# Patient Record
Sex: FEMALE | Race: WHITE | NOT HISPANIC OR LATINO | URBAN - METROPOLITAN AREA
[De-identification: names, ages, dates, MRNs, and addresses within clinical notes are randomized per-mention and may not be internally consistent; named-entity substitution may affect disease eponyms.]

---

## 2021-02-15 ENCOUNTER — TELEPHONE (OUTPATIENT)
Dept: PSYCHOLOGY | Facility: CLINIC | Age: 56
End: 2021-02-15

## 2022-03-18 PROBLEM — R01.1 MURMUR: Status: ACTIVE | Noted: 2017-03-02

## 2022-03-18 PROBLEM — R35.0 FREQUENCY OF URINATION: Status: ACTIVE | Noted: 2018-05-29

## 2022-03-19 PROBLEM — L73.2 HYDRADENITIS: Status: ACTIVE | Noted: 2017-01-17

## 2022-03-19 PROBLEM — R82.998 CRYSTALLURIA: Status: ACTIVE | Noted: 2018-05-29

## 2022-08-02 LAB — HBA1C MFR BLD HPLC: 5.7 %

## 2022-08-24 ENCOUNTER — OFFICE VISIT (OUTPATIENT)
Dept: RHEUMATOLOGY | Facility: CLINIC | Age: 57
End: 2022-08-24
Payer: MEDICARE

## 2022-08-24 VITALS
HEIGHT: 66 IN | BODY MASS INDEX: 43.62 KG/M2 | SYSTOLIC BLOOD PRESSURE: 120 MMHG | DIASTOLIC BLOOD PRESSURE: 74 MMHG | WEIGHT: 271.4 LBS

## 2022-08-24 DIAGNOSIS — M17.10 ARTHRITIS OF KNEE: ICD-10-CM

## 2022-08-24 DIAGNOSIS — E66.01 OBESITY, MORBID (HCC): ICD-10-CM

## 2022-08-24 DIAGNOSIS — M05.79 RHEUMATOID ARTHRITIS INVOLVING MULTIPLE SITES WITH POSITIVE RHEUMATOID FACTOR (HCC): Primary | ICD-10-CM

## 2022-08-24 PROCEDURE — 99204 OFFICE O/P NEW MOD 45 MIN: CPT | Performed by: INTERNAL MEDICINE

## 2022-08-24 RX ORDER — METHOTREXATE 2.5 MG/1
TABLET ORAL
COMMUNITY
Start: 2022-06-01 | End: 2022-08-24 | Stop reason: SDUPTHER

## 2022-08-24 RX ORDER — OMEPRAZOLE 40 MG/1
CAPSULE, DELAYED RELEASE ORAL
COMMUNITY
Start: 2022-05-21

## 2022-08-24 RX ORDER — HYDROCODONE BITARTRATE AND ACETAMINOPHEN 7.5; 325 MG/1; MG/1
TABLET ORAL
COMMUNITY
Start: 2022-06-13

## 2022-08-24 RX ORDER — METHOTREXATE 2.5 MG/1
TABLET ORAL
Qty: 100 TABLET | Refills: 3 | Status: SHIPPED | OUTPATIENT
Start: 2022-08-24

## 2022-08-24 RX ORDER — PREDNISONE 1 MG/1
5 TABLET ORAL 2 TIMES DAILY WITH MEALS
Qty: 60 TABLET | Refills: 3 | Status: SHIPPED | OUTPATIENT
Start: 2022-08-24

## 2022-08-24 RX ORDER — LIOTHYRONINE SODIUM 5 UG/1
TABLET ORAL
COMMUNITY
Start: 2022-08-16

## 2022-08-24 RX ORDER — METOPROLOL SUCCINATE 50 MG/1
TABLET, EXTENDED RELEASE ORAL
COMMUNITY
Start: 2022-08-16

## 2022-08-24 RX ORDER — ROPINIROLE 2 MG/1
TABLET, FILM COATED ORAL
COMMUNITY
Start: 2022-08-22

## 2022-08-24 RX ORDER — ZOLPIDEM TARTRATE 12.5 MG/1
TABLET, FILM COATED, EXTENDED RELEASE ORAL
COMMUNITY
Start: 2022-07-29

## 2022-08-24 RX ORDER — DICLOFENAC SODIUM 20 MG/G
40 SOLUTION TOPICAL 2 TIMES DAILY
Qty: 112 G | Refills: 3 | Status: SHIPPED | OUTPATIENT
Start: 2022-08-24 | End: 2022-09-06 | Stop reason: SDUPTHER

## 2022-08-24 RX ORDER — AMMONIUM LACTATE 12 G/100G
LOTION TOPICAL
COMMUNITY
Start: 2022-06-13

## 2022-08-24 RX ORDER — APIXABAN 5 MG/1
TABLET, FILM COATED ORAL
COMMUNITY
Start: 2022-08-16

## 2022-08-24 RX ORDER — FOLIC ACID 1 MG/1
2 TABLET ORAL DAILY
Qty: 180 TABLET | Refills: 3 | Status: SHIPPED | OUTPATIENT
Start: 2022-08-24

## 2022-08-24 RX ORDER — MOMETASONE FUROATE 1 MG/G
CREAM TOPICAL
COMMUNITY
Start: 2022-06-13

## 2022-08-24 RX ORDER — LEVOTHYROXINE SODIUM 150 MCG
TABLET ORAL
COMMUNITY
Start: 2022-06-01

## 2022-08-24 RX ORDER — FENTANYL 25 UG/H
PATCH TRANSDERMAL
COMMUNITY
Start: 2022-06-13

## 2022-08-24 RX ORDER — FOLIC ACID 1 MG/1
TABLET ORAL
COMMUNITY
Start: 2022-07-29 | End: 2022-08-24 | Stop reason: SDUPTHER

## 2022-08-24 NOTE — PATIENT INSTRUCTIONS
Continue taking the folic acid 2 tabs every day  Take the methotrexate every Friday but divide the dose to 4 tabs at dinner and 4 tabs at bedtime (this will help with the absorption of the medication)  Please continue to have your blood work done every 3 months  You can also try Aspercreme with lidocaine and apply it to your painful joints 2-3 times per day

## 2022-08-24 NOTE — PROGRESS NOTES
Rheumatology Consult   Sharifa Waddell 65 y o  female 1965    DATE: 8/24/2022    Reason for Consult: 12 yr history of RA    Assessment and Plan:  Seropositive RA on methotrexate 88SU weekly and folic acid daily  Patient failed [place]  Rash with Plaquenil  Inefficacy of Arava  Continue PT for knees  Anti-inflammatory diet/exercise/weight control  Topical Pennsaid BID knee pain  Topical analgesics PRN  F/u in 6 mos  or sooner if necessary    History of Present Illness:  Sharifa Waddell is a 65 y o  female Seropositive RA diagnosed 16 yrs  ago on MTX and folic acid  Takes prednisone PRN joint swelling  No current complaints  All recent labs reviewed in detail with patient  Review of Systems  Review of Systems   Constitutional: Negative for fatigue, fever and unexpected weight change  HENT: Negative for mouth sores  Respiratory: Negative for cough and shortness of breath  Cardiovascular: Negative for chest pain and leg swelling  Gastrointestinal: Negative for abdominal pain, constipation and diarrhea  Musculoskeletal: Positive for arthralgias  Negative for back pain, joint swelling and myalgias  Skin: Negative for color change and rash  Neurological: Negative for weakness  Hematological: Negative for adenopathy  Psychiatric/Behavioral: Negative for sleep disturbance  Allergies  Allergies   Allergen Reactions    Mixed Feathers Itching    Other Hives    Quinolones Other (See Comments)     Ruptured tendons       Current Medications      Past Medical History  History reviewed  No pertinent past medical history  Past Surgical History  History reviewed  No pertinent surgical history  Family History  No known autoimmune or inflammatory diseases in the family  History reviewed  No pertinent family history      Social History  Occupation: disabled  Social History     Substance and Sexual Activity   Alcohol Use None     Social History     Substance and Sexual Activity   Drug Use Never     Social History     Tobacco Use   Smoking Status Current Every Day Smoker    Packs/day: 1 50    Years: 35 00    Pack years: 52 50    Types: Cigarettes   Smokeless Tobacco Never Used        Objective:  /74   Ht 5' 6" (1 676 m)   Wt 123 kg (271 lb 6 4 oz)   BMI 43 81 kg/m²     Physical Exam  Musculoskeletal:      Right hand: Tenderness present  Left hand: Tenderness present  Left knee: Crepitus present  Lab Results: I have personally reviewed pertinent reports        CBC:   , Chemistry Profile:       Invalid input(s): ALBUMIN, Coagulation Studies:   , Cardiac Studies:   , Additional Labs:   , iSTAT CHEM 8:       Invalid input(s): POTASSIUMIS, ABG:   , Toxicology:   , Last A1C/Lipid Panel/Thyroid Panel: No results found for: HGBA1C, TRIG, CHOL, HDL, LDLCALC, FFQ8SEFPQLCM, T3FREE, N5TFRNQ, FREET4  No results found for: CRP, SEDRATE, AP, RF, HAV, HEPAIGM, HEPBIGM, HEPBCAB, HEPCAB, HBEAG        Invalid input(s): URIBILINOGEN         Imaging: I have personally reviewed pertinent films in PACS

## 2022-09-06 ENCOUNTER — TELEPHONE (OUTPATIENT)
Dept: RHEUMATOLOGY | Facility: CLINIC | Age: 57
End: 2022-09-06

## 2022-09-06 DIAGNOSIS — M05.79 RHEUMATOID ARTHRITIS INVOLVING MULTIPLE SITES WITH POSITIVE RHEUMATOID FACTOR (HCC): ICD-10-CM

## 2022-09-06 DIAGNOSIS — M17.10 ARTHRITIS OF KNEE: ICD-10-CM

## 2022-09-06 RX ORDER — DICLOFENAC SODIUM 20 MG/G
40 SOLUTION TOPICAL 2 TIMES DAILY
Qty: 112 G | Refills: 3 | Status: SHIPPED | OUTPATIENT
Start: 2022-09-06 | End: 2022-09-12 | Stop reason: ALTCHOICE

## 2022-09-06 NOTE — TELEPHONE ENCOUNTER
Patient called in regards to this, she would like it to be sent to [facility] mail away pharmacy in [place]       Cheaper then [place]     Please advise and call her back : [phone]

## 2022-09-09 ENCOUNTER — TELEPHONE (OUTPATIENT)
Dept: OBGYN CLINIC | Facility: HOSPITAL | Age: 57
End: 2022-09-09

## 2022-09-09 NOTE — TELEPHONE ENCOUNTER
Pt is calling stating that she needs the generic brand for Diclofenac Sodium sent to the pt pharmacy 3 month supply     [address], [address] [address]   Phone:  [phone]  Fax:  [phone]       #[phone]

## 2022-09-12 DIAGNOSIS — M17.10 ARTHRITIS OF KNEE: Primary | ICD-10-CM

## 2022-09-12 RX ORDER — DICLOFENAC SODIUM 20 MG/G
40 SOLUTION TOPICAL 2 TIMES DAILY
Qty: 112 G | Refills: 3 | Status: SHIPPED | OUTPATIENT
Start: 2022-09-12 | End: 2022-09-14

## 2022-09-13 DIAGNOSIS — M17.10 ARTHRITIS OF KNEE: ICD-10-CM

## 2022-09-14 RX ORDER — DICLOFENAC SODIUM 16.05 MG/ML
40 SOLUTION TOPICAL 2 TIMES DAILY
Qty: 150 ML | Refills: 3 | Status: SHIPPED | OUTPATIENT
Start: 2022-09-14 | End: 2022-09-26 | Stop reason: ALTCHOICE

## 2022-09-20 NOTE — TELEPHONE ENCOUNTER
Patient stating that the prescription that was sent in was 1 5% Solution and the Original order was for 2 0% solution  [facility] needs prescription for Diclofenic Sodium with 2% pump with generic substitution if available placed on script

## 2022-09-26 DIAGNOSIS — M17.10 ARTHRITIS OF KNEE: Primary | ICD-10-CM

## 2022-09-26 RX ORDER — DICLOFENAC SODIUM 20 MG/G
40 SOLUTION TOPICAL 2 TIMES DAILY
Qty: 112 G | Refills: 3 | Status: SHIPPED | OUTPATIENT
Start: 2022-09-26

## 2022-09-26 NOTE — TELEPHONE ENCOUNTER
Patient would like a call from the doctor    She trying to get her medication fill since 9/9/22    # [phone]

## 2022-09-30 NOTE — TELEPHONE ENCOUNTER
Patient called asking to speak ONLY to DR Kathy Gary about medication   Patient would not disclose any information and states she only wants to speak to the doctor directly

## 2022-10-03 NOTE — TELEPHONE ENCOUNTER
The insurance totally denied her prescription twice  They will not allow this medication, and felt she could use an OTC analgesic  I could contact them and try and set up a peer to peer conference to see if they will approve it for you

## 2023-02-27 ENCOUNTER — OFFICE VISIT (OUTPATIENT)
Dept: RHEUMATOLOGY | Facility: CLINIC | Age: 58
End: 2023-02-27

## 2023-02-27 VITALS
WEIGHT: 274.8 LBS | HEIGHT: 66 IN | BODY MASS INDEX: 44.16 KG/M2 | DIASTOLIC BLOOD PRESSURE: 70 MMHG | SYSTOLIC BLOOD PRESSURE: 124 MMHG

## 2023-02-27 DIAGNOSIS — J40 BRONCHITIS: ICD-10-CM

## 2023-02-27 DIAGNOSIS — M25.562 ACUTE PAIN OF LEFT KNEE: ICD-10-CM

## 2023-02-27 DIAGNOSIS — Z79.899 HIGH RISK MEDICATION USE: ICD-10-CM

## 2023-02-27 DIAGNOSIS — M17.10 ARTHRITIS OF KNEE: ICD-10-CM

## 2023-02-27 DIAGNOSIS — M05.79 RHEUMATOID ARTHRITIS INVOLVING MULTIPLE SITES WITH POSITIVE RHEUMATOID FACTOR (HCC): Primary | ICD-10-CM

## 2023-02-27 DIAGNOSIS — E66.01 OBESITY, MORBID (HCC): ICD-10-CM

## 2023-02-27 RX ORDER — ALBUTEROL SULFATE 90 UG/1
AEROSOL, METERED RESPIRATORY (INHALATION)
COMMUNITY

## 2023-02-27 RX ORDER — AMOXICILLIN AND CLAVULANATE POTASSIUM 875; 125 MG/1; MG/1
1 TABLET, FILM COATED ORAL EVERY 12 HOURS SCHEDULED
Qty: 28 TABLET | Refills: 1 | Status: SHIPPED | OUTPATIENT
Start: 2023-02-27 | End: 2023-03-13

## 2023-02-27 RX ORDER — CETIRIZINE HYDROCHLORIDE 10 MG/1
1 TABLET ORAL
COMMUNITY

## 2023-02-27 RX ORDER — DICLOFENAC SODIUM 20 MG/G
40 SOLUTION TOPICAL 2 TIMES DAILY
Qty: 112 G | Refills: 3 | Status: SHIPPED | OUTPATIENT
Start: 2023-02-27

## 2023-02-27 NOTE — PROGRESS NOTES
Assessment and Plan:   Seropositive RA--continue MTX 42AZ weekly with folic acid daily  Continue Pennsaid PRN  Prednisone PRN arthritis flares as well  Consider PT for knees  Augmentin Rx for URI  Anti-inflammatory diet/exercise/weight control  Increase CV fitness/aerobic activity  Patient to continue to monitor symptoms  Topical NSAIDs/analgesics PRN joint pain  Continue to monitor labs including CRP, CMP, CBC every 12 weeks  F/u in 6 mos  or sooner if necessary      Rheumatic Disease Summary  As above    Here for f/u visit  Left knee pain s/p trauma  She has no side effects on MTX  She takes prednisone PRN arthritis flares  All recent labs reviewed with patient  The following portions of the patient's history were reviewed and updated as appropriate: allergies, current medications, past family history, past medical history, past social history, past surgical history and problem list     Review of Systems:   Review of Systems   Constitutional: Negative for fatigue  HENT: Negative for mouth sores  Eyes: Negative for pain  Respiratory: Negative for shortness of breath  Cardiovascular: Negative for leg swelling  Musculoskeletal: Positive for arthralgias  Negative for joint swelling  Skin: Negative for rash  Neurological: Negative for weakness  Hematological: Negative for adenopathy  Psychiatric/Behavioral: Negative for sleep disturbance         Home Medications:    Current Outpatient Medications:   •  amoxicillin-clavulanate (AUGMENTIN) 875-125 mg per tablet, Take 1 tablet by mouth every 12 (twelve) hours for 14 days, Disp: 28 tablet, Rfl: 1  •  Diclofenac Sodium 2 % SOLN, Apply 40 mg topically 2 (two) times a day Generic for Pennsaid 2% diclofenac solution is acceptable , Disp: 112 g, Rfl: 3  •  albuterol (PROVENTIL HFA,VENTOLIN HFA) 90 mcg/act inhaler, ProAir HFA 90 mcg/actuation aerosol inhaler  Inhale 2 puffs as needed by inhalation route , Disp: , Rfl:   •  ammonium lactate (LAC-HYDRIN) 12 % lotion, , Disp: , Rfl:   •  cetirizine (ZyrTEC) 10 mg tablet, Take 1 tablet by mouth, Disp: , Rfl:   •  Cholecalciferol 125 MCG (5000 UT) TABS, Take by mouth, Disp: , Rfl:   •  Eliquis 5 MG, , Disp: , Rfl:   •  fentaNYL (DURAGESIC) 25 mcg/hr, , Disp: , Rfl:   •  folic acid (FOLVITE) 1 mg tablet, Take 2 tablets (2 mg total) by mouth daily, Disp: 180 tablet, Rfl: 3  •  HYDROcodone-acetaminophen (NORCO) 7 5-325 mg per tablet, , Disp: , Rfl:   •  liothyronine (CYTOMEL) 5 mcg tablet, , Disp: , Rfl:   •  methotrexate 2 5 MG tablet, Take 8 tabs by mouth once a week (split to dose to 4 at dinner and 4 at bedtime)  , Disp: 100 tablet, Rfl: 3  •  metoprolol succinate (TOPROL-XL) 50 mg 24 hr tablet, , Disp: , Rfl:   •  mometasone (ELOCON) 0 1 % cream, , Disp: , Rfl:   •  omeprazole (PriLOSEC) 40 MG capsule, , Disp: , Rfl:   •  predniSONE 5 mg tablet, Take 1 tablet (5 mg total) by mouth 2 (two) times a day with meals, Disp: 60 tablet, Rfl: 3  •  rOPINIRole (REQUIP) 2 mg tablet, , Disp: , Rfl:   •  Synthroid 150 MCG tablet, , Disp: , Rfl:   •  zolpidem (AMBIEN CR) 12 5 MG CR tablet, , Disp: , Rfl:     Objective:    Vitals:    02/27/23 1015   BP: 124/70   Weight: 125 kg (274 lb 12 8 oz)   Height: 5' 6" (1 676 m)       Physical Exam  Constitutional:       General: She is not in acute distress  HENT:      Head: Normocephalic and atraumatic  Eyes:      Conjunctiva/sclera: Conjunctivae normal    Cardiovascular:      Rate and Rhythm: Normal rate and regular rhythm  Heart sounds: S1 normal and S2 normal      No friction rub  Pulmonary:      Effort: Pulmonary effort is normal  No respiratory distress  Breath sounds: Normal breath sounds  No wheezing, rhonchi or rales  Musculoskeletal:      Cervical back: Neck supple  Skin:     Coloration: Skin is not pale  Neurological:      Mental Status: She is alert  Mental status is at baseline     Psychiatric:         Mood and Affect: Mood normal  Behavior: Behavior normal          Reviewed labs and imaging  Imaging:   No results found      Labs:   Orders Only on 08/02/2022   Component Date Value Ref Range Status   • Hemoglobin A1C 08/02/2022 5 7   Final

## 2023-02-27 NOTE — PATIENT INSTRUCTIONS
Continue the methotrexate weekly  Continue the folic acid every day  Continue to eat a healthy anti-inflammatory diet and exercise every day  Please continue to have your blood work done every 3 months  Continue the Pennsaid as needed for your your painful joints  Take the prednisone as needed

## 2023-05-17 RX ORDER — OMEPRAZOLE 40 MG/1
40 CAPSULE, DELAYED RELEASE ORAL DAILY
COMMUNITY

## 2023-05-17 RX ORDER — FENTANYL 25 UG/H
1 PATCH TRANSDERMAL
COMMUNITY

## 2023-05-17 RX ORDER — FEXOFENADINE HCL 180 MG/1
180 TABLET ORAL DAILY
COMMUNITY

## 2023-05-17 RX ORDER — DULOXETIN HYDROCHLORIDE 20 MG/1
20 CAPSULE, DELAYED RELEASE ORAL DAILY
COMMUNITY

## 2023-05-17 RX ORDER — METOPROLOL SUCCINATE 25 MG/1
25 TABLET, EXTENDED RELEASE ORAL DAILY
COMMUNITY

## 2023-05-17 RX ORDER — HYDROCODONE BITARTRATE AND ACETAMINOPHEN 7.5; 325 MG/1; MG/1
TABLET ORAL
COMMUNITY

## 2023-05-17 RX ORDER — LEVOTHYROXINE SODIUM 175 UG/1
175 TABLET ORAL
COMMUNITY

## 2023-07-24 DIAGNOSIS — M05.79 RHEUMATOID ARTHRITIS INVOLVING MULTIPLE SITES WITH POSITIVE RHEUMATOID FACTOR (HCC): ICD-10-CM

## 2023-07-25 RX ORDER — FOLIC ACID 1 MG/1
2 TABLET ORAL DAILY
Qty: 180 TABLET | Refills: 0 | Status: SHIPPED | OUTPATIENT
Start: 2023-07-25

## 2023-07-26 LAB — HBA1C MFR BLD HPLC: 5.5 %

## 2023-10-09 ENCOUNTER — TELEMEDICINE (OUTPATIENT)
Dept: RHEUMATOLOGY | Facility: CLINIC | Age: 58
End: 2023-10-09

## 2023-10-09 DIAGNOSIS — M05.79 RHEUMATOID ARTHRITIS INVOLVING MULTIPLE SITES WITH POSITIVE RHEUMATOID FACTOR (HCC): ICD-10-CM

## 2023-10-09 RX ORDER — FOLIC ACID 1 MG/1
2 TABLET ORAL DAILY
Qty: 180 TABLET | Refills: 3 | Status: SHIPPED | OUTPATIENT
Start: 2023-10-09

## 2023-10-09 RX ORDER — PREDNISONE 5 MG/1
5 TABLET ORAL 2 TIMES DAILY WITH MEALS
Qty: 60 TABLET | Refills: 3 | Status: SHIPPED | OUTPATIENT
Start: 2023-10-09

## 2023-10-09 NOTE — PROGRESS NOTES
Assessment and Plan:   Seropositive RA--continue MTX 60LC weekly with folic acid daily  Prednisone PRN arthritis flares as well  Consider PT for knees for quadriceps-strengthening exercises  Anti-inflammatory diet/exercise/weight control  Increase CV fitness/aerobic activity  Patient to continue to monitor symptoms  Topical NSAIDs/analgesics PRN joint pain  Continue to monitor labs including CRP, CMP, CBC every 12 weeks given high risk medication/methotrexate. F/u in 6 mos. or sooner if necessary     Rheumatic Disease Summary  As above      Here for f/u virtual visit. All labs from July reviewed with patient. No side effects on current medication regimen. We discussed my departure from [facility] and she will find a rheumatologist closer to her house. The following portions of the patient's history were reviewed and updated as appropriate: allergies, current medications, past family history, past medical history, past social history, past surgical history and problem list.    Review of Systems:   Review of Systems   Constitutional: Negative for fatigue. HENT: Negative for mouth sores. Eyes: Negative for pain. Respiratory: Negative for shortness of breath. Cardiovascular: Negative for leg swelling. Musculoskeletal: Positive for arthralgias. Negative for joint swelling. Skin: Negative for rash. Neurological: Negative for weakness. Hematological: Negative for adenopathy. Psychiatric/Behavioral: Negative for sleep disturbance.        Home Medications:    Current Outpatient Medications:   •  folic acid (FOLVITE) 1 mg tablet, Take 2 tablets (2 mg total) by mouth daily, Disp: 180 tablet, Rfl: 3  •  methotrexate 2.5 mg tablet, Take 8 tabs by mouth once a week, Disp: 104 tablet, Rfl: 3  •  predniSONE 5 mg tablet, Take 1 tablet (5 mg total) by mouth 2 (two) times a day with meals, Disp: 60 tablet, Rfl: 3  •  albuterol (PROVENTIL HFA,VENTOLIN HFA) 90 mcg/act inhaler, ProAir HFA 90 mcg/actuation aerosol inhaler  Inhale 2 puffs as needed by inhalation route., Disp: , Rfl:   •  ammonium lactate (LAC-HYDRIN) 12 % lotion, , Disp: , Rfl:   •  cetirizine (ZyrTEC) 10 mg tablet, Take 1 tablet by mouth, Disp: , Rfl:   •  Cholecalciferol 125 MCG (5000 UT) TABS, Take by mouth, Disp: , Rfl:   •  Diclofenac Sodium 2 % SOLN, Apply 40 mg topically 2 (two) times a day Generic for Pennsaid 2% diclofenac solution is acceptable., Disp: 112 g, Rfl: 3  •  Eliquis 5 MG, , Disp: , Rfl:   •  fentaNYL (DURAGESIC) 25 mcg/hr, , Disp: , Rfl:   •  HYDROcodone-acetaminophen (NORCO) 7.5-325 mg per tablet, , Disp: , Rfl:   •  liothyronine (CYTOMEL) 5 mcg tablet, , Disp: , Rfl:   •  methotrexate 2.5 MG tablet, Take 8 tabs by mouth once a week (split to dose to 4 at dinner and 4 at bedtime). , Disp: 100 tablet, Rfl: 3  •  metoprolol succinate (TOPROL-XL) 50 mg 24 hr tablet, , Disp: , Rfl:   •  mometasone (ELOCON) 0.1 % cream, , Disp: , Rfl:   •  omeprazole (PriLOSEC) 40 MG capsule, , Disp: , Rfl:   •  rOPINIRole (REQUIP) 2 mg tablet, , Disp: , Rfl:   •  Synthroid 150 MCG tablet, , Disp: , Rfl:   •  zolpidem (AMBIEN CR) 12.5 MG CR tablet, , Disp: , Rfl:     Objective: There were no vitals filed for this visit. Physical Exam  Constitutional:       General: She is not in acute distress. HENT:      Head: Normocephalic and atraumatic. Eyes:      Conjunctiva/sclera: Conjunctivae normal.   Pulmonary:      Effort: Pulmonary effort is normal. No respiratory distress. Musculoskeletal:      Cervical back: Neck supple. Skin:     Coloration: Skin is not pale. Neurological:      Mental Status: She is alert. Mental status is at baseline. Psychiatric:         Mood and Affect: Mood normal.         Behavior: Behavior normal.         Reviewed labs and imaging.     Imaging:   Venous Duplex Lower Extremities Bilateral    Result Date: 10/2/2023  Narrative: •  Duplex ultrasound reveals a non-occlusive right femoral deep venous thrombosis - this was previously reported on prior venous ultrasound on 9/24/21. •  Duplex ultrasound reveals no evidence of deep venous thrombosis of the imaged left lower extremity venous vessels. Procedure/Study quality: Color and PW dopper interrogation with augmentation of the bilateral common femoral, saphenofemoral junction, femoral, popliteal, peroneal and posterior tibial veins were performed.  Limited views of the superficial venous vessels, namely segments of the greater and lesser saphenous and veins was performed.  Other calf veins not adequately visualized on this study.  Note venous insufficiency (reflux) not performed on this study. Results:  Right lower extremity veins: Common femoral vein: Normal compression, phasic flow without echogenic material. Saphenofemoral junction: Normal compression, phasic flow without echogenic material. Proximal femoral vein: There is non-occlusive echogenic material that is likely due to a nonocclusive deep venous thrombosis. Mid femoral vein: There is non-occlusive echogenic material that is likely due to a nonocclusive deep venous thrombosis. Distal femoral vein: There is non-occlusive echogenic material that is likely due to a nonocclusive deep venous thrombosis.  Popliteal vein: Normal compression, phasic flow without echogenic material. Posterior tibial vein: Normal compression, phasic flow without echogenic material. Peroneal vein: Normal compression, phasic flow without echogenic material. Greater saphenous vein (limited to above the knee segment): Normal compression, phasic flow without echogenic material. Greater saphenous vein (limited to below the knee segment): Normal compression, phasic flow without echogenic material. Small saphenous vein: Normal compression, phasic flow without echogenic material. Left lower extremity veins: Common femoral vein: Normal compression, phasic flow without echogenic material. Saphenofemoral junction: Normal compression, phasic flow without echogenic material. Proximal femoral vein: Normal compression, phasic flow without echogenic material. Mid femoral vein: Normal compression, phasic flow without echogenic material. Distal femoral vein: Normal compression, phasic flow without echogenic material. Popliteal vein: Normal compression, phasic flow without echogenic material. Posterior tibial vein: Normal compression, phasic flow without echogenic material. Peroneal vein: Normal compression, phasic flow without echogenic material. Greater saphenous vein (limited to above the knee segment): Normal compression, phasic flow without echogenic material. Greater saphenous vein (limited to below the knee segment): Normal compression, phasic flow without echogenic material. Small saphenous vein: Normal compression, phasic flow without echogenic material. Impression: Duplex ultrasound reveals a non-occlusive right femoral deep venous thrombosis - this was previously reported on prior venous ultrasound on 9/24/21. Duplex ultrasound reveals no evidence of deep venous thrombosis of the imaged left lower extremity venous vessels.   If further venous reflux assessment required, please order dedicated venous reflux study as this test performed primarily for patency. Compared to the prior study on 9/24/21, there is no significant change. [place] [id number] [facility] Study Quality Overall the study quality was adequate. Procedure Description A lower extremity venous duplex exam to assess patency and/or insufficiency employs gray scale, color and spectral Doppler imaging to evaluate the common femoral, femoral, popliteal, posterior tibial, peroneal, and great and small saphenous veins. Additional imaging of the distal external iliac, profunda, gastrocnemius, soleal and anterior tibial veins could be included. For mapping studies, the great and small saphenous veins are evaluated for patency and diameter measurements are obtained.       Labs:   Orders Only on 07/26/2023 Component Date Value Ref Range Status   • Hemoglobin A1C 07/26/2023 5.5   Final

## 2023-10-09 NOTE — PATIENT INSTRUCTIONS
Continue methotrexate weekly and folic acid daily. Continue taking prednisone only as needed for any arthritis flares. Please continue to have your blood work checked every 3 months because you are taking methotrexate.

## 2023-12-18 ENCOUNTER — TELEPHONE (OUTPATIENT)
Dept: DERMATOLOGY | Facility: CLINIC | Age: 58
End: 2023-12-18